# Patient Record
Sex: FEMALE | Race: WHITE | NOT HISPANIC OR LATINO
[De-identification: names, ages, dates, MRNs, and addresses within clinical notes are randomized per-mention and may not be internally consistent; named-entity substitution may affect disease eponyms.]

---

## 2017-12-21 PROCEDURE — 83970 ASSAY OF PARATHORMONE: CPT | Performed by: INTERNAL MEDICINE

## 2017-12-21 PROCEDURE — 86038 ANTINUCLEAR ANTIBODIES: CPT | Performed by: INTERNAL MEDICINE

## 2017-12-21 PROCEDURE — 86812 HLA TYPING A B OR C: CPT | Performed by: INTERNAL MEDICINE

## 2017-12-21 PROCEDURE — 86255 FLUORESCENT ANTIBODY SCREEN: CPT | Performed by: INTERNAL MEDICINE

## 2017-12-21 PROCEDURE — 83516 IMMUNOASSAY NONANTIBODY: CPT | Performed by: INTERNAL MEDICINE

## 2017-12-21 PROCEDURE — 86200 CCP ANTIBODY: CPT | Performed by: INTERNAL MEDICINE

## 2017-12-21 PROCEDURE — 83876 ASSAY MYELOPEROXIDASE: CPT | Performed by: INTERNAL MEDICINE

## 2018-01-12 PROBLEM — M15.9 PRIMARY OSTEOARTHRITIS INVOLVING MULTIPLE JOINTS: Status: ACTIVE | Noted: 2018-01-12

## 2018-03-13 ENCOUNTER — CHARTING TRANS (OUTPATIENT)
Dept: OTHER | Age: 58
End: 2018-03-13

## 2018-09-12 ENCOUNTER — CHARTING TRANS (OUTPATIENT)
Dept: OTHER | Age: 58
End: 2018-09-12

## 2018-11-01 VITALS
OXYGEN SATURATION: 97 % | DIASTOLIC BLOOD PRESSURE: 76 MMHG | RESPIRATION RATE: 16 BRPM | HEIGHT: 63 IN | WEIGHT: 180 LBS | SYSTOLIC BLOOD PRESSURE: 124 MMHG | BODY MASS INDEX: 31.89 KG/M2 | TEMPERATURE: 98.2 F | HEART RATE: 86 BPM

## 2024-12-25 NOTE — ED PROVIDER NOTES
Patient Seen in: [facility] In [place]      History     Chief Complaint   Patient presents with    Ear Pain     Stated Complaint: Right Ear Pain    Subjective:   HPI    64-year-old female who presents to the ER for ear pain of the right side ongoing for the past several days and worsening today.  Reports pain is particularly worse with palpation of the ear.  Reports she had COVID about 2 weeks ago and other symptoms resolved aside from congestion.  No recent fevers.  No other complaints.    Objective:     Past Medical History:    Diverticulitis    Osteoarthritis              Past Surgical History:   Procedure Laterality Date    Other surgical history      ruptured corpus luteal cyst    Other surgical history Bilateral     B hammertoe, B bunion    Other surgical history      lumpectomy- benign                Social History     Socioeconomic History    Marital status: Single   Tobacco Use    Smoking status: Former     Current packs/day: 0.00     Average packs/day: 0.5 packs/day for 30.0 years (15.0 ttl pk-yrs)     Types: Cigarettes     Start date: 1987     Quit date: 2017     Years since quittin.5    Smokeless tobacco: Never   Substance and Sexual Activity    Alcohol use: Yes     Comment: social    Drug use: No     Social Drivers of Health     Food Insecurity: Low Risk  (2024)    Received from [facility] Food Security     Within the past 12 months, the food you bought just didn't last and you didn't have money to get more.: 3     Within the past 12 months, you worried that your food would run out before you got money to buy more.: 3   Transportation Needs: Not At Risk (2024)    Received from [facility] Transportation Needs     In the past 12 months, has lack of reliable transportation kept you from medical appointments, meetings, work or from getting things needed for daily living?: No   Physical Activity: Inactive (2024)    Received from  [facility]    Physical Activity     On average, how many days per week do you engage in moderate to strenuous exercise (like a brisk walk)?: 0 days     On average, how many minutes do you engage in exercise at this level?: 0 min     On average, how many minutes do you engage in exercise at this level?: 0 min     On average, how many days per week do you engage in moderate to strenuous exercise (like a brisk walk)?: 0 days   Housing Stability: Not At Risk (9/25/2024)    Received from [facility] Housing     What is your living situation today?: I have a steady place to live     Think about the place you live. Do you have problems with any of the following?: None of the above                  Physical Exam     ED Triage Vitals [12/24/24 1837]   /65   Pulse 75   Resp 18   Temp (!) 96.2 °F (35.7 °C)   Temp src Temporal   SpO2 97 %   O2 Device None (Room air)       Current Vitals:   Vital Signs  BP: 123/65  Pulse: 75  Resp: 18  Temp: (!) 96.2 °F (35.7 °C)  Temp src: Temporal    Oxygen Therapy  SpO2: 97 %  O2 Device: None (Room air)        Physical Exam  GENERAL APPEARANCE:  AxOx4, generally well-appearing, no acute distress.  HEENT:  NC, AT.  No mastoid tenderness on exam.  Normal-appearing external ear.  There is significant cerumen impaction of the right side and unable to visualize tympanic membrane at first.  There is effusion noted to the left tympanic membrane posteriorly with no other abnormalities.  After cerumen impaction was removed, there is found to be a right sided effusion, no signs of infection consistent with otitis media.  Ear canal is slightly erythematous and macerated.  NECK:  Supple without lymphadenopathy.  No stiffness or restricted ROM.  RESPIRATORY: Normal rate, no respiratory distress.  EXTREMITIES:  Without cyanosis, clubbing or edema. Normal ROM.  NEUROLOGICAL:  Grossly nonfocal. Observed to ambulate with normal gait.  Skin: Normal color and no visible rashes.        ED Course    Labs Reviewed - No data to display              MDM      64-year-old female who presents to the ER for right ear pain.  See history and exam above.  Cerumen impaction was removed and patient had significant improvement in her pain.  Is possible that her pain is mostly caused by the cerumen impaction although there is also noted to be bilateral posterior effusion, discussed continued Sudafed and Flonase at home.  Given topical antibiotics due to now erythema and maceration from cerumen impaction removal.  Discussed continued supportive management at home and return precautions.  Ready for discharge.        Medical Decision Making      Disposition and Plan     Clinical Impression:  1. Acute otitis externa of right ear, unspecified type    2. Right ear impacted cerumen         Disposition:  Discharge  12/24/2024  7:40 pm    Follow-up:  Galo Hernandez  [address]  [facility] [id number] [phone]    Follow up            Medications Prescribed:  Discharge Medication List as of 12/24/2024  7:42 PM        START taking these medications    Details   ciprofloxacin-dexamethasone 0.3-0.1 % Otic Suspension Place 4 drops into the right ear 2 (two) times daily for 7 days., Normal, Disp-7.5 mL, R-0                 Supplementary Documentation:

## 2024-12-25 NOTE — DISCHARGE INSTRUCTIONS
Start applying  antibiotic eardrops to right ear as directed to completion.  Avoid using Q-tips in ear in the future, gently clean around the edges of the ear instead.  You can use Debrox over-the-counter eardrops in the future to help remove earwax.  Take Tylenol or ibuprofen for pain if needed.  Return to the ER for any other new or worsening symptoms.

## 2025-03-10 NOTE — ED INITIAL ASSESSMENT (HPI)
Pt to ed after a fall in a restaurant Friday night. PT fell on left hip, + bruising. PT with recently on blood thinners for ablation in November, last took 2 weeks ago.

## 2025-03-10 NOTE — ED PROVIDER NOTES
Patient Seen in: [facility] In [place]      History     Chief Complaint   Patient presents with    Fall    Leg or Foot Injury     Stated Complaint: fell on friday and injured left hip.  denies hitting head.    Subjective:   HPI    64-year-old female who comes in today after accidentally tripping over a cord at the restaurant Friday night after a fall.  Patient fell landing on mostly her left hip and thigh.  She has ecchymosis to the area.  Patient was recently on blood thinners 2 weeks ago but discontinued after she had ablation for her atrial fibrillation.  Patient has bruising to the area denies any calf pain or swelling.  Has been able to walk but does feel some pain to the area.  Hit her wrist but states that she has full range of motion of her wrist and elbow.     Objective:     Past Medical History:    Diverticulitis    Osteoarthritis              Past Surgical History:   Procedure Laterality Date    Other surgical history      ruptured corpus luteal cyst    Other surgical history Bilateral     B hammertoe, B bunion    Other surgical history      lumpectomy- benign                Social History     Socioeconomic History    Marital status: Single   Tobacco Use    Smoking status: Former     Current packs/day: 0.00     Average packs/day: 0.5 packs/day for 30.0 years (15.0 ttl pk-yrs)     Types: Cigarettes     Start date: 1987     Quit date: 2017     Years since quittin.7    Smokeless tobacco: Never   Vaping Use    Vaping status: Never Used   Substance and Sexual Activity    Alcohol use: Yes     Comment: social    Drug use: No     Social Drivers of Health     Food Insecurity: Low Risk  (2024)    Received from [facility]     Within the past 12 months, the food you bought just didn't last and you didn't have money to get more.: 3     Within the past 12 months, you worried that your food would run out before you got money to buy more.: 3   Transportation  Needs: Not At Risk (9/25/2024)    Received from [facility] Transportation Needs     In the past 12 months, has lack of reliable transportation kept you from medical appointments, meetings, work or from getting things needed for daily living?: No   Housing Stability: Not At Risk (9/25/2024)    Received from [facility] Housing     What is your living situation today?: I have a steady place to live     Think about the place you live. Do you have problems with any of the following?: None of the above                  Physical Exam     ED Triage Vitals [03/10/25 0831]   /87   Pulse 78   Resp 18   Temp 98.6 °F (37 °C)   Temp src Oral   SpO2 100 %   O2 Device None (Room air)       Current Vitals:   Vital Signs  BP: 142/88  Pulse: 80  Resp: 16  Temp: 98.6 °F (37 °C)  Temp src: Oral    Oxygen Therapy  SpO2: 99 %  O2 Device: None (Room air)        Physical Exam  Vitals and nursing note reviewed.   Constitutional:       General: She is not in acute distress.     Appearance: Normal appearance. She is well-developed. She is not diaphoretic.   HENT:      Head: Normocephalic and atraumatic.   Cardiovascular:      Rate and Rhythm: Normal rate and regular rhythm.   Pulmonary:      Effort: Pulmonary effort is normal. No respiratory distress.      Breath sounds: Normal breath sounds.   Musculoskeletal:      Left elbow: Normal.      Left forearm: Normal.      Left wrist: Normal.      Cervical back: Normal range of motion.      Left hip: Tenderness and bony tenderness present. Normal range of motion.      Left upper leg: Swelling and tenderness present. No bony tenderness.      Left knee: Normal. No swelling, deformity or ecchymosis. Normal range of motion.      Left lower leg: Normal.      Comments: Patient has ecchymosis to the lateral aspect of the left hip and thigh mild swelling around the ecchymosis no redness full range of motion of the hip without difficulty   Skin:     General: Skin is warm and dry.       Capillary Refill: Capillary refill takes less than 2 seconds.      Coloration: Skin is not pale.      Findings: No erythema or rash.   Neurological:      Mental Status: She is alert and oriented to person, place, and time.      Motor: No abnormal muscle tone.      Coordination: Coordination normal.      Deep Tendon Reflexes: Reflexes are normal and symmetric.   Psychiatric:         Behavior: Behavior normal.         Thought Content: Thought content normal.         Judgment: Judgment normal.           ED Course   Labs Reviewed - No data to display     XR FEMUR MIN 2 VIEWS LEFT (CPT=73552)    Result Date: 3/10/2025  PROCEDURE:  XR FEMUR MIN 2 VIEWS LEFT (CPT=73552)  TECHNIQUE:  AP and lateral views were obtained.  COMPARISON:  None.  INDICATIONS:  fell on friday and injured left hip.  denies hitting head.  Leg pain  PATIENT STATED HISTORY: (As transcribed by Technologist)  Patient states she has left leg pain after a fall on Friday. She adds most of the pain is at the left hip.   FINDINGS:  No evidence of acute displaced fracture or dislocation.  Normal mineralization.  Changes of previous left total knee arthroplasty.  Mild osteoarthritic changes in the left femoral acetabular joint.            CONCLUSION:  No evidence of acute displaced fracture or dislocation in the left femur.  LOCATION:  [facility]   Dictated by (CST): Winston Cardenas MD on 3/10/2025 at 9:29 AM     Finalized by (CST): Winston Cardenas MD on 3/10/2025 at 9:29 AM       XR HIP W OR WO PELVIS 2 OR 3 VIEWS, LEFT (CPT=73502)    Result Date: 3/10/2025  PROCEDURE:  XR HIP W OR WO PELVIS 2 OR 3 VIEWS, LEFT (CPT=73502)  TECHNIQUE:  Unilateral 2 to 3 views of the hip and pelvis if performed.  COMPARISON:  None.  INDICATIONS:  fell on friday and injured left hip.  denies hitting head., hip pain  PATIENT STATED HISTORY: (As transcribed by Technologist)  Patient complains of left anterior hip pain after a fall on Friday.   FINDINGS:  No evidence of acute displaced  fracture or dislocation.  Normal mineralization.  Mild degenerative changes in the partially imaged lower lumbar spine.  Pelvic phleboliths.  Minimal osteoarthritic changes in the left femoral acetabular joint.            CONCLUSION:  No evidence of acute displaced fracture or dislocation in the left hip.  LOCATION:  [facility]   Dictated by (CST): Winston Cardenas MD on 3/10/2025 at 9:27 AM     Finalized by (CST): Winston Cardenas MD on 3/10/2025 at 9:27 AM           MDM       Medical Decision Making  64-year-old female who 3 days ago fell at a restaurant down on her left hip, denies hitting head or loss of consciousness.  Patient is having persistent pain.    Problems Addressed:  Contusion of left hip and thigh, initial encounter: acute illness or injury  Hematoma of left lower extremity, initial encounter: acute illness or injury    Amount and/or Complexity of Data Reviewed  Radiology: ordered and independent interpretation performed. Decision-making details documented in ED Course.     Details: I personally viewed the patient's hip and thigh x-ray, patient has ecchymosis and mild swelling noted to the left lateral thigh and have tenderness to palpation but no bony tenderness and full range of motion of the hip.    Risk  OTC drugs.  Risk Details: Clinical Impression: Hematoma left thigh, left hip and thigh contusion      The differential diagnosis before testing included hematoma, acute fracture, contusion, which is a medical condition that poses a threat to life/function.   Discussed RICE instructions follow-up with orthopedic if persistent or worsening symptoms, if any leg swelling or redness be reevaluated          Disposition and Plan     Clinical Impression:  1. Contusion of left hip and thigh, initial encounter    2. Hematoma of left lower extremity, initial encounter         Disposition:  Discharge  3/10/2025  9:39 am    Follow-up:  Justino Kline MD  [address]  [address]  [facility]  [address]  [phone]    Schedule an appointment as soon as possible for a visit  If symptoms worsen          Medications Prescribed:  Discharge Medication List as of 3/10/2025  9:39 AM              Supplementary Documentation:

## 2025-03-10 NOTE — DISCHARGE INSTRUCTIONS
The best treatment for minor injuries is R.I.C.E. and NSAID medications.      R.I.C.E. = Rest  Ice  Compression  Elevation      Rest: Do not use the injured body part unnecessarily.  If this is a lower extremity, do not take long walks, run or do anything that causes increased pain.  Gradually progress to your normal activity, using pain as your guide.       Ice: Apply cold compresses to the injured area. The area that is injured is inflammed. Inflammation causes warmth, so ice may give some relief.  You may ice through a brace or ace wrap.      Compression: Compression to the area will help control swelling. An ace wrap is the most simple form of compression. You can also wear a brace.      Elevation: Raise the injured extremity above heart level. This will reduce throbbing pain and swelling associated with injures.  Prop the injured extremity up with pillows while lying down.